# Patient Record
Sex: FEMALE | Race: OTHER | NOT HISPANIC OR LATINO | URBAN - METROPOLITAN AREA
[De-identification: names, ages, dates, MRNs, and addresses within clinical notes are randomized per-mention and may not be internally consistent; named-entity substitution may affect disease eponyms.]

---

## 2024-03-02 ENCOUNTER — EMERGENCY (EMERGENCY)
Facility: HOSPITAL | Age: 69
LOS: 1 days | Discharge: HOME | End: 2024-03-02
Attending: EMERGENCY MEDICINE | Admitting: EMERGENCY MEDICINE
Payer: MEDICARE

## 2024-03-02 VITALS
TEMPERATURE: 98 F | HEART RATE: 63 BPM | SYSTOLIC BLOOD PRESSURE: 139 MMHG | RESPIRATION RATE: 18 BRPM | HEIGHT: 64 IN | WEIGHT: 160.06 LBS | DIASTOLIC BLOOD PRESSURE: 65 MMHG | OXYGEN SATURATION: 95 %

## 2024-03-02 PROCEDURE — 99283 EMERGENCY DEPT VISIT LOW MDM: CPT

## 2024-03-02 NOTE — ED PROVIDER NOTE - NSFOLLOWUPINSTRUCTIONS_ED_ALL_ED_FT
Lipoma  A person with a benign tumor (lipoma) on the shoulder and neck area.  A lipoma is a noncancerous (benign) tumor that is made up of fat cells. This is a very common type of soft-tissue growth. Lipomas are usually found under the skin (subcutaneous). They may occur in any tissue of the body that contains fat. Common areas for lipomas to appear include the back, arms, shoulders, buttocks, and thighs.    Lipomas grow slowly, and they are usually painless. Most lipomas do not cause problems and do not require treatment.    What are the causes?  The cause of this condition is not known.    What increases the risk?  You are more likely to develop this condition if:  You are 40–60 years old.  You have a family history of lipomas.  What are the signs or symptoms?  A lipoma usually appears as a small, round bump under the skin. In most cases, the lump will:  Feel soft or rubbery.  Not cause pain or other symptoms.  However, if a lipoma is located in an area where it pushes on nerves, it can become painful or cause other symptoms.    How is this diagnosed?  A lipoma can usually be diagnosed with a physical exam. You may also have tests to confirm the diagnosis and to rule out other conditions. Tests may include:  Imaging tests, such as a CT scan or an MRI.  Removal of a tissue sample to be looked at under a microscope (biopsy).  How is this treated?  Treatment for this condition depends on the size of the lipoma and whether it is causing any symptoms.  For small lipomas that are not causing problems, no treatment is needed.  If a lipoma is bigger or it causes problems, surgery may be done to remove the lipoma. Lipomas can also be removed to improve appearance. Most often, the procedure is done after applying a medicine that numbs the area (local anesthetic).  Liposuction may be done to reduce the size of the lipoma before it is removed through surgery, or it may be done to remove the lipoma. Lipomas are removed with this method to limit incision size and scarring. A liposuction tube is inserted through a small incision into the lipoma, and the contents of the lipoma are removed through the tube with suction.  Follow these instructions at home:  Watch your lipoma for any changes.  Keep all follow-up visits. This is important.  Where to find more information  OrthoInfo: orthoinfo.aaos.org  Contact a health care provider if:  Your lipoma becomes larger or hard.  Your lipoma becomes painful, red, or increasingly swollen. These could be signs of infection or a more serious condition.  Get help right away if:  You develop tingling or numbness in an area near the lipoma. This could indicate that the lipoma is causing nerve damage.  Summary  A lipoma is a noncancerous tumor that is made up of fat cells.  Most lipomas do not cause problems and do not require treatment.  If a lipoma is bigger or it causes problems, surgery may be done to remove the lipoma.  Contact a health care provider if your lipoma becomes larger or hard, or if it becomes painful, red, or increasingly swollen. These could be signs of infection or a more serious condition.  This information is not intended to replace advice given to you by your health care provider. Make sure you discuss any questions you have with your health care provider.    Document Revised: 01/06/2023 Document Reviewed: 01/06/2023  [employer] Patient Education © 2024 [employer]  [employer] logo  Terms and Conditions  Privacy Policy  Editorial Policy  All content on this site: Copyright © 2024 [employer], its licensors, and contributors. All rights are reserved, including those for text and data mining, AI training, and similar technologies. For all open access content, the Creative Commons licensing terms apply.  Cookies are used by this site. To decline or learn more, visit our Cookies page.  [employer]

## 2024-03-02 NOTE — ED PROVIDER NOTE - PHYSICAL EXAMINATION
Const: No apparent distress  Eyes: PERRL, no conjunctival injection  HENT:  Neck supple without meningismus, soft swelling to back of neck. No erythema. no tenderness    CV: RRR, Warm, well-perfused extremities  RESP: CTA B/L, no tachypnea   MSK: No gross deformities appreciated  Skin: Warm, dry. No rashes  Neuro: Alert, CNs II-XII grossly intact. Sensation and motor function of extremities grossly intact.  Psych: Appropriate mood and affect.

## 2024-03-02 NOTE — ED PROVIDER NOTE - CARE PROVIDER_API CALL
Lisa Munroe  Dermatology  [address] [place] [address], [place] [address]  Phone: [phone]  Fax: [phone]  Follow Up Time: Routine

## 2024-03-02 NOTE — ED PROVIDER NOTE - OBJECTIVE STATEMENT
67 yo F with PMH of HTN, hypothyroidism, HLD presents to the ED for swelling to the back of her neck she noticed Sunday. Patient states she developed some neck pain on Sunday she called her primary care doctor who told her to take ibuprofen and the pain is improved but then she noticed the swelling.  No tenderness to the area no erythema no fever no chills no nausea no vomiting.  No pain with movement of neck.

## 2024-03-02 NOTE — ED PROVIDER NOTE - CLINICAL SUMMARY MEDICAL DECISION MAKING FREE TEXT BOX
68-year-old female presents to the emergency department for sore soft tissue swelling to back of neck concerning for lipoma.  Bedside ultrasound does not demonstrate any fluid collection.  No signs of abscess.  DC home with dermatology follow-up.

## 2024-03-02 NOTE — ED ADULT TRIAGE NOTE - CHIEF COMPLAINT QUOTE
c/o of posterior neck abscess x 1 week, causing limited ROM. denies numbness/tingling in the upper/lower extremities.

## 2024-03-02 NOTE — ED PROVIDER NOTE - PATIENT PORTAL LINK FT
You can access the FollowMyHealth Patient Portal offered by [facility] by registering at the following website: [email or web address]. By joining [facility]’s FollowMyHealth portal, you will also be able to view your health information using other applications (apps) compatible with our system.

## 2024-03-04 DIAGNOSIS — R22.1 LOCALIZED SWELLING, MASS AND LUMP, NECK: ICD-10-CM

## 2024-03-04 DIAGNOSIS — I10 ESSENTIAL (PRIMARY) HYPERTENSION: ICD-10-CM

## 2024-03-04 DIAGNOSIS — E03.9 HYPOTHYROIDISM, UNSPECIFIED: ICD-10-CM

## 2024-03-04 DIAGNOSIS — E78.5 HYPERLIPIDEMIA, UNSPECIFIED: ICD-10-CM

## 2024-03-04 DIAGNOSIS — M54.2 CERVICALGIA: ICD-10-CM
